# Patient Record
Sex: FEMALE | Race: BLACK OR AFRICAN AMERICAN | Employment: FULL TIME | URBAN - METROPOLITAN AREA
[De-identification: names, ages, dates, MRNs, and addresses within clinical notes are randomized per-mention and may not be internally consistent; named-entity substitution may affect disease eponyms.]

---

## 2017-05-11 ENCOUNTER — OFFICE VISIT (OUTPATIENT)
Dept: FAMILY MEDICINE CLINIC | Age: 62
End: 2017-05-11

## 2017-05-11 VITALS
HEART RATE: 74 BPM | WEIGHT: 133 LBS | TEMPERATURE: 98.2 F | HEIGHT: 64 IN | BODY MASS INDEX: 22.71 KG/M2 | OXYGEN SATURATION: 99 % | RESPIRATION RATE: 16 BRPM | DIASTOLIC BLOOD PRESSURE: 67 MMHG | SYSTOLIC BLOOD PRESSURE: 130 MMHG

## 2017-05-11 DIAGNOSIS — E53.8 B12 DEFICIENCY: ICD-10-CM

## 2017-05-11 DIAGNOSIS — E78.00 HYPERCHOLESTEROLEMIA: Primary | ICD-10-CM

## 2017-05-11 NOTE — PROGRESS NOTES
Pt here to establish care. Requesting to have routine fasting lab work. Denies having any concerns at this time. Pt reports a history of heart murmur when sick or under strain, pt had a heart cath in 2001. Pt has a history of B12 deficiency. Pt has been maintained on oral supplementation. Subjective: (As above and below)     Chief Complaint   Patient presents with   [address]     she is a 63y.o. year old female who presents for evaluation. Reviewed PmHx, RxHx, FmHx, SocHx, AllgHx and updated in chart. Review of Systems - negative except as listed above    Objective:     Vitals:    05/11/17 0940   BP: 130/67   Pulse: 74   Resp: 16   Temp: 98.2 °F (36.8 °C)   TempSrc: Oral   SpO2: 99%   Weight: 133 lb (60.3 kg)   Height: 5' 4\" (1.626 m)     Physical Examination: General appearance - alert, well appearing, and in no distress  Mental status - normal mood, behavior, speech, dress, motor activity, and thought processes  Eyes - pupils equal and reactive, extraocular eye movements intact  Mouth - mucous membranes moist, pharynx normal without lesions  Chest - clear to auscultation, no wheezes, rales or rhonchi, symmetric air entry  Heart - normal rate, regular rhythm, normal S1, S2, no murmurs, rubs, clicks or gallops  Musculoskeletal - no joint tenderness, deformity or swelling  Extremities - peripheral pulses normal, no pedal edema, no clubbing or cyanosis    Assessment/ Plan:   1. Hypercholesterolemia  -check fasting labs  - CBC WITH AUTOMATED DIFF  - LIPID PANEL  - METABOLIC PANEL, COMPREHENSIVE  - TSH 3RD GENERATION  - VITAMIN D, 25 HYDROXY  - HEMOGLOBIN A1C WITH EAG  - HEPATITIS C AB    2. B12 deficiency  - VITAMIN B12     Follow-up Disposition: As needed  I have discussed the diagnosis with the patient and the intended plan as seen in the above orders. The patient has received an after-visit summary and questions were answered concerning future plans.      Medication Side Effects and Warnings were discussed with patient: yes  Patient Labs were reviewed: yes  Patient Past Records were reviewed:  yes    Adriana Chapman M.D.

## 2017-05-11 NOTE — PROGRESS NOTES
Pt here to establish care. Requesting to have routine fasting lab work. Denies having any concerns at this time.

## 2017-05-11 NOTE — MR AVS SNAPSHOT
Visit Information Date & Time Provider Department Dept. Phone Encounter #  
 5/11/2017  9:15 AM Angelo Marrufo MD [address] [facility] [phone] [id number] Upcoming Health Maintenance Date Due Hepatitis C Screening 1955 DTaP/Tdap/Td series (1 - Tdap) 3/26/1978 ZOSTER VACCINE AGE 60> 3/26/2017 INFLUENZA AGE 9 TO ADULT 8/1/2017 PAP AKA CERVICAL CYTOLOGY 4/21/2018 BREAST CANCER SCRN MAMMOGRAM 4/29/2018 COLONOSCOPY 3/19/2019 Allergies as of 5/11/2017  Review Complete On: 5/11/2017 By: Angelo Marrufo MD  
  
 Severity Noted Reaction Type Reactions Aspirin  03/08/2010   Side Effect Other (comments) Stomach pain and bleeding Codeine  04/21/2015    Other (comments) Pt states causes severe abd pain  
 Pcn [Penicillins]  03/08/2010    Hives Also causes nosebleeds Current Immunizations  Reviewed on 4/21/2015 No immunizations on file. Not reviewed this visit You Were Diagnosed With   
  
 Codes Comments Hypercholesterolemia    -  Primary ICD-10-CM: E78.00 ICD-9-CM: 272.0 B12 deficiency     ICD-10-CM: E53.8 ICD-9-CM: 266.2 Vitals BP Pulse Temp Resp Height(growth percentile) Weight(growth percentile) 130/67 (BP 1 Location: Left arm, BP Patient Position: Sitting) 74 98.2 °F (36.8 °C) (Oral) 16 5' 4\" (1.626 m) 133 lb (60.3 kg) LMP SpO2 BMI OB Status Smoking Status 04/04/2012 99% 22.83 kg/m2 Postmenopausal Former Smoker Vitals History BMI and BSA Data Body Mass Index Body Surface Area  
 22.83 kg/m 2 1.65 m 2 Preferred Pharmacy Pharmacy Name Phone [id number] [address] [place] [address] [phone] Your Updated Medication List  
  
   
This list is accurate as of: 5/11/17 10:27 AM.  Always use your most recent med list.  
  
  
  
  
 CALCIUM WITH VITAMIN D tablet Generic drug:  calcium-cholecalciferol (D3)  
 Take 1 Tab by mouth daily. Indications: HYPOCALCEMIA PREVENTION  
  
 FISH OIL 1,000 mg Cap Generic drug:  omega-3 fatty acids-vitamin e Take 1 Cap by mouth daily. ibuprofen 200 mg tablet Commonly known as:  MOTRIN Take  by mouth every six (6) hours as needed. MULTI-KATHERINE 50 AND OVER Tab tablet Generic drug:  multivitamins-minerals-lutein Take 1 Tab by mouth daily. Indications: VITAMIN DEFICIENCY PREVENTION  
  
 VITAMIN B-12 1,000 mcg tablet Generic drug:  cyanocobalamin Take 1,000 mcg by mouth every morning. We Performed the Following CBC WITH AUTOMATED DIFF [22179 CPT(R)] HEMOGLOBIN A1C WITH EAG [37273 CPT(R)] HEPATITIS C AB [00139 CPT(R)] LIPID PANEL [59478 CPT(R)] METABOLIC PANEL, COMPREHENSIVE [16514 CPT(R)] TSH 3RD GENERATION [39173 CPT(R)] VITAMIN B12 [id number] CPT(R)] VITAMIN D, 25 HYDROXY [id number] CPT(R)] To-Do List   
 06/02/2017 3:20 PM  
  Appointment with [facility] SEVERO 1 at [facility] ([phone]) Shower or bathe using soap and water. Do not use deodorant, powder, perfumes, or lotion the day of your exam.  If your prior mammograms were not performed at [place] [address] please bring films with you or forward prior images 2 days before your procedure. Check in at registration 15min before your appointment time unless you were instructed to do otherwise. A script is not necessary, but if you have one, please bring it on the day of the mammogram or have it faxed to the department. [facility] [phone] [facility] [place]  [phone] [facility] [address] [place]  [phone] [facility] [phone] [address] [phone] [facility] & HEALTH SERVICES! Dear Judit Prather: Thank you for requesting a Krikle account. Our records indicate that you already have an active Krikle account. You can access your account anytime at [email or web address] Did you know that you can access your hospital and ER discharge instructions at any time in [employer]? You can also review all of your test results from your hospital stay or ER visit. Additional Information If you have questions, please visit the Frequently Asked Questions section of the [employer] website at [email or web address] Remember, [employer] is NOT to be used for urgent needs. For medical emergencies, dial 911. Now available from your iPhone and Android! Please provide this summary of care documentation to your next provider. Your primary care clinician is listed as Samantha Lantigua. If you have any questions after today's visit, please call [phone].

## 2017-05-12 LAB
25(OH)D3+25(OH)D2 SERPL-MCNC: 50.1 NG/ML (ref 30–100)
ALBUMIN SERPL-MCNC: 5 G/DL (ref 3.6–4.8)
ALBUMIN/GLOB SERPL: 2.2 {RATIO} (ref 1.2–2.2)
ALP SERPL-CCNC: 75 IU/L (ref 39–117)
ALT SERPL-CCNC: 15 IU/L (ref 0–32)
AST SERPL-CCNC: 19 IU/L (ref 0–40)
BASOPHILS # BLD AUTO: 0 X10E3/UL (ref 0–0.2)
BASOPHILS NFR BLD AUTO: 0 %
BILIRUB SERPL-MCNC: 0.6 MG/DL (ref 0–1.2)
BUN SERPL-MCNC: 11 MG/DL (ref 8–27)
BUN/CREAT SERPL: 12 (ref 12–28)
CALCIUM SERPL-MCNC: 9.7 MG/DL (ref 8.7–10.3)
CHLORIDE SERPL-SCNC: 101 MMOL/L (ref 96–106)
CHOLEST SERPL-MCNC: 227 MG/DL (ref 100–199)
CO2 SERPL-SCNC: 24 MMOL/L (ref 18–29)
CREAT SERPL-MCNC: 0.91 MG/DL (ref 0.57–1)
EOSINOPHIL # BLD AUTO: 0.2 X10E3/UL (ref 0–0.4)
EOSINOPHIL NFR BLD AUTO: 2 %
ERYTHROCYTE [DISTWIDTH] IN BLOOD BY AUTOMATED COUNT: 14.1 % (ref 12.3–15.4)
EST. AVERAGE GLUCOSE BLD GHB EST-MCNC: 126 MG/DL
GLOBULIN SER CALC-MCNC: 2.3 G/DL (ref 1.5–4.5)
GLUCOSE SERPL-MCNC: 95 MG/DL (ref 65–99)
HBA1C MFR BLD: 6 % (ref 4.8–5.6)
HCT VFR BLD AUTO: 40.3 % (ref 34–46.6)
HCV AB S/CO SERPL IA: <0.1 S/CO RATIO (ref 0–0.9)
HDLC SERPL-MCNC: 94 MG/DL
HGB BLD-MCNC: 13 G/DL (ref 11.1–15.9)
IMM GRANULOCYTES # BLD: 0 X10E3/UL (ref 0–0.1)
IMM GRANULOCYTES NFR BLD: 0 %
INTERPRETATION, 910389: NORMAL
LDLC SERPL CALC-MCNC: 122 MG/DL (ref 0–99)
LYMPHOCYTES # BLD AUTO: 2.6 X10E3/UL (ref 0.7–3.1)
LYMPHOCYTES NFR BLD AUTO: 37 %
MCH RBC QN AUTO: 29 PG (ref 26.6–33)
MCHC RBC AUTO-ENTMCNC: 32.3 G/DL (ref 31.5–35.7)
MCV RBC AUTO: 90 FL (ref 79–97)
MONOCYTES # BLD AUTO: 0.6 X10E3/UL (ref 0.1–0.9)
MONOCYTES NFR BLD AUTO: 8 %
NEUTROPHILS # BLD AUTO: 3.6 X10E3/UL (ref 1.4–7)
NEUTROPHILS NFR BLD AUTO: 53 %
PLATELET # BLD AUTO: 355 X10E3/UL (ref 150–379)
POTASSIUM SERPL-SCNC: 4.3 MMOL/L (ref 3.5–5.2)
PROT SERPL-MCNC: 7.3 G/DL (ref 6–8.5)
RBC # BLD AUTO: 4.48 X10E6/UL (ref 3.77–5.28)
SODIUM SERPL-SCNC: 140 MMOL/L (ref 134–144)
TRIGL SERPL-MCNC: 56 MG/DL (ref 0–149)
TSH SERPL DL<=0.005 MIU/L-ACNC: 0.77 UIU/ML (ref 0.45–4.5)
VIT B12 SERPL-MCNC: 360 PG/ML (ref 211–946)
VLDLC SERPL CALC-MCNC: 11 MG/DL (ref 5–40)
WBC # BLD AUTO: 6.9 X10E3/UL (ref 3.4–10.8)

## 2017-05-12 NOTE — PROGRESS NOTES
Cholesterol is elevated, please closely monitor diet and increase exercise, recheck in 6 months. Increase in risk for diabetes, please closely monitor diet and increase exercise   All other labs are within normal limits. A message has been sent in [employer] and the lab work released to the patient.

## 2017-06-02 ENCOUNTER — HOSPITAL ENCOUNTER (OUTPATIENT)
Dept: MAMMOGRAPHY | Age: 62
Discharge: HOME | End: 2017-06-02
Attending: INTERNAL MEDICINE
Payer: COMMERCIAL

## 2017-06-02 DIAGNOSIS — Z12.31 VISIT FOR SCREENING MAMMOGRAM: ICD-10-CM

## 2017-06-02 PROCEDURE — 77067 SCR MAMMO BI INCL CAD: CPT

## 2017-06-13 ENCOUNTER — OFFICE VISIT (OUTPATIENT)
Dept: FAMILY MEDICINE CLINIC | Age: 62
End: 2017-06-13

## 2017-06-13 VITALS
SYSTOLIC BLOOD PRESSURE: 128 MMHG | HEIGHT: 64 IN | RESPIRATION RATE: 18 BRPM | TEMPERATURE: 98.3 F | BODY MASS INDEX: 22.71 KG/M2 | DIASTOLIC BLOOD PRESSURE: 64 MMHG | HEART RATE: 88 BPM | WEIGHT: 133 LBS | OXYGEN SATURATION: 99 %

## 2017-06-13 DIAGNOSIS — Z01.419 WELL WOMAN EXAM WITH ROUTINE GYNECOLOGICAL EXAM: Primary | ICD-10-CM

## 2017-06-13 NOTE — PROGRESS NOTES
Pt here for PAP only. States that she had fasting lab work done in April. Subjective:   63 y.o. female for Well Woman Check. Patient's last menstrual period was 2012. Social History: has sex with males. Pertinent past medical hstory:   Past Medical History:   Diagnosis Date    Menorrhagia     Vitamin B12 deficiency      Patient Active Problem List   Diagnosis Code    Fibroid uterus D25.9    Menorrhagia N92.0    History of   Z98.891    History of Tonsillectomy age 8 Z80.80    Hypercholesterolemia E78.00     Current Outpatient Prescriptions   Medication Sig Dispense Refill    omega-3 fatty acids-vitamin e (FISH OIL) 1,000 mg cap Take 1 Cap by mouth daily.  ibuprofen (MOTRIN) 200 mg tablet Take  by mouth every six (6) hours as needed.  cyanocobalamin (VITAMIN B-12) 1,000 mcg tablet Take 1,000 mcg by mouth every morning.  Calcium-Cholecalciferol, D3, (CALCIUM WITH VITAMIN D) 600-400 mg-unit Tab Take 1 Tab by mouth daily. Indications: HYPOCALCEMIA PREVENTION      multivitamins-minerals-lutein (MULTI-KATHERINE 50 & OVER) Tab Take 1 Tab by mouth daily. Indications: VITAMIN DEFICIENCY PREVENTION       Allergies   Allergen Reactions    Aspirin Other (comments)     Stomach pain and bleeding    Codeine Other (comments)     Pt states causes severe abd pain    Pcn [Penicillins] Hives     Also causes nosebleeds        ROS:  Feeling well. No dyspnea or chest pain on exertion. No abdominal pain, change in bowel habits, black or bloody stools. No urinary tract symptoms. GYN ROS: normal menses, no abnormal bleeding, pelvic pain or discharge, no breast pain or new or enlarging lumps on self exam. No neurological complaints.     Objective:     Visit Vitals    /64 (BP 1 Location: Left arm, BP Patient Position: Sitting)    Pulse 88    Temp 98.3 °F (36.8 °C) (Oral)    Resp 18    Ht 5' 4\" (1.626 m)    Wt 133 lb (60.3 kg)    LMP 2012    SpO2 99%    BMI 22.83 kg/m2 The patient appears well, alert, oriented x 3, in no distress. ENT normal.  Neck supple. No adenopathy or thyromegaly. TERESSA. Lungs are clear, good air entry, no wheezes, rhonchi or rales. S1 and S2 normal, no murmurs, regular rate and rhythm. Abdomen soft without tenderness, guarding, mass or organomegaly. Extremities show no edema, normal peripheral pulses. Neurological is normal, no focal findings. BREAST EXAM: breasts appear normal, no suspicious masses, no skin or nipple changes or axillary nodes    PELVIC EXAM: VULVA: normal appearing vulva with no masses, tenderness or lesions, VAGINA: normal appearing vagina with normal color and discharge, no lesions, CERVIX: normal appearing cervix without discharge or lesions, UTERUS: uterus is normal size, shape, consistency and nontender, ADNEXA: normal adnexa in size, nontender and no masses, PAP: Pap smear done today    Assessment/Plan:   well woman  Mammogram-done  pap smear  additional lab tests per orders  return annually or prn    ICD-10-CM ICD-9-CM    1. Well woman exam with routine gynecological exam Z01.419 V72.31 PAP + HPV DNA (HIGH RISK)    [V72.31]     Encounter Diagnoses   Name Primary?  Well woman exam with routine gynecological exam Yes    Comment: [V72.31]     Orders Placed This Encounter    PAP, HPV HIGH RISK   .

## 2017-06-13 NOTE — MR AVS SNAPSHOT
Visit Information Date & Time Provider Department Dept. Phone Encounter #  
 6/13/2017  8:30 AM Ashu Fernandez MD [address] [facility] [phone] [id number] Upcoming Health Maintenance Date Due DTaP/Tdap/Td series (1 - Tdap) 3/26/1978 ZOSTER VACCINE AGE 60> 3/26/2017 INFLUENZA AGE 9 TO ADULT 8/1/2017 PAP AKA CERVICAL CYTOLOGY 4/21/2018 COLONOSCOPY 3/19/2019 BREAST CANCER SCRN MAMMOGRAM 6/2/2019 Allergies as of 6/13/2017  Review Complete On: 6/13/2017 By: Ashu Fernandez MD  
  
 Severity Noted Reaction Type Reactions Aspirin  03/08/2010   Side Effect Other (comments) Stomach pain and bleeding Codeine  04/21/2015    Other (comments) Pt states causes severe abd pain  
 Pcn [Penicillins]  03/08/2010    Hives Also causes nosebleeds Current Immunizations  Reviewed on 4/21/2015 No immunizations on file. Not reviewed this visit You Were Diagnosed With   
  
 Codes Comments Well woman exam with routine gynecological exam    -  Primary ICD-10-CM: F63.425 ICD-9-CM: V72.31 [V72.31] Vitals BP Pulse Temp Resp Height(growth percentile) Weight(growth percentile) 128/64 (BP 1 Location: Left arm, BP Patient Position: Sitting) 88 98.3 °F (36.8 °C) (Oral) 18 5' 4\" (1.626 m) 133 lb (60.3 kg) LMP SpO2 BMI OB Status Smoking Status 04/04/2012 99% 22.83 kg/m2 Postmenopausal Former Smoker Vitals History BMI and BSA Data Body Mass Index Body Surface Area  
 22.83 kg/m 2 1.65 m 2 Preferred Pharmacy Pharmacy Name Phone [id number] [address] [place] [address] [phone] Your Updated Medication List  
  
   
This list is accurate as of: 6/13/17  9:13 AM.  Always use your most recent med list.  
  
  
  
  
 CALCIUM WITH VITAMIN D tablet Generic drug:  calcium-cholecalciferol (D3) Take 1 Tab by mouth daily.  Indications: HYPOCALCEMIA PREVENTION  
  
 FISH OIL 1,000 mg Cap Generic drug:  omega-3 fatty acids-vitamin e Take 1 Cap by mouth daily. ibuprofen 200 mg tablet Commonly known as:  MOTRIN Take  by mouth every six (6) hours as needed. MULTI-KATHERINE 50 AND OVER Tab tablet Generic drug:  multivitamins-minerals-lutein Take 1 Tab by mouth daily. Indications: VITAMIN DEFICIENCY PREVENTION  
  
 VITAMIN B-12 1,000 mcg tablet Generic drug:  cyanocobalamin Take 1,000 mcg by mouth every morning. We Performed the Following PAP + HPV DNA (HIGH RISK) [06413 CPT(R)] Introducing [facility] & [facility] SERVICES! Dear VANDANA KEARNS [facility]: Thank you for requesting a Rebit account. Our records indicate that you already have an active Rebit account. You can access your account anytime at [email or web address]. [email or web address] Medical Systems/Ladies Who Launch Did you know that you can access your hospital and ER discharge instructions at any time in Rebit? You can also review all of your test results from your hospital stay or ER visit. Additional Information If you have questions, please visit the Frequently Asked Questions section of the Rebit website at [email or web address] Systems/[email or web address] Who Launch/. Remember, Re[employer] is NOT to be used for urgent needs. For medical emergencies, dial 911. Now available from your iPhone and Android! Please provide this summary of care documentation to your next provider. Your primary care clinician is listed as Samantha Lantigua. If you have any questions after today's visit, please call [phone].

## 2017-06-15 LAB
CYTOLOGIST CVX/VAG CYTO: NORMAL
DX ICD CODE: NORMAL
HPV I/H RISK 1 DNA CVX QL PROBE+SIG AMP: NEGATIVE
Lab: NORMAL
OTHER STN SPEC: NORMAL
PATH REPORT.FINAL DX SPEC: NORMAL
STAT OF ADQ CVX/VAG CYTO-IMP: NORMAL

## 2017-06-15 NOTE — PROGRESS NOTES
PAP WNL  HPV neg  A message has been sent in [employer] and the lab work released to the patient.

## 2018-06-26 ENCOUNTER — OFFICE VISIT (OUTPATIENT)
Dept: FAMILY MEDICINE CLINIC | Age: 63
End: 2018-06-26

## 2018-06-26 VITALS
HEART RATE: 81 BPM | OXYGEN SATURATION: 98 % | HEIGHT: 64 IN | WEIGHT: 133 LBS | SYSTOLIC BLOOD PRESSURE: 138 MMHG | DIASTOLIC BLOOD PRESSURE: 81 MMHG | TEMPERATURE: 81 F | BODY MASS INDEX: 22.71 KG/M2 | RESPIRATION RATE: 18 BRPM

## 2018-06-26 DIAGNOSIS — E78.00 HYPERCHOLESTEROLEMIA: Primary | ICD-10-CM

## 2018-06-26 DIAGNOSIS — E53.8 B12 DEFICIENCY: ICD-10-CM

## 2018-06-26 DIAGNOSIS — Z00.00 ROUTINE GENERAL MEDICAL EXAMINATION AT A HEALTH CARE FACILITY: ICD-10-CM

## 2018-06-26 NOTE — MR AVS SNAPSHOT
[address] [place] [address] 
[phone] Patient: Humberto Travis MRN: [id number] YTC:[id number] Visit Information Date & Time Provider Department Dept. Phone Encounter #  
 6/26/2018  8:30 AM Ariana Roberts MD [address] [facility] [phone] [id number] Upcoming Health Maintenance Date Due DTaP/Tdap/Td series (1 - Tdap) 3/26/1978 ZOSTER VACCINE AGE 60> 1/26/2017 Influenza Age 5 to Adult 8/1/2018 COLONOSCOPY 3/19/2019 BREAST CANCER SCRN MAMMOGRAM 6/2/2019 PAP AKA CERVICAL CYTOLOGY 6/13/2020 Allergies as of 6/26/2018  Review Complete On: 6/26/2018 By: Ariana Roberts MD  
  
 Severity Noted Reaction Type Reactions Aspirin  03/08/2010   Side Effect Other (comments) Stomach pain and bleeding Codeine  04/21/2015    Other (comments) Pt states causes severe abd pain  
 Pcn [Penicillins]  03/08/2010    Hives Also causes nosebleeds Current Immunizations  Reviewed on 4/21/2015 No immunizations on file. Not reviewed this visit You Were Diagnosed With   
  
 Codes Comments Hypercholesterolemia    -  Primary ICD-10-CM: E78.00 ICD-9-CM: 272.0 B12 deficiency     ICD-10-CM: E53.8 ICD-9-CM: 266.2 Routine general medical examination at a health care facility     ICD-10-CM: Z00.00 ICD-9-CM: V70.0 Vitals BP Pulse Temp Resp Height(growth percentile) Weight(growth percentile) 144/72 (BP 1 Location: Left arm, BP Patient Position: Sitting) 81 (!) 81 °F (27.2 °C) (Oral) 18 5' 4\" (1.626 m) 133 lb (60.3 kg) LMP SpO2 BMI OB Status Smoking Status 04/04/2012 98% 22.83 kg/m2 Postmenopausal Former Smoker Vitals History BMI and BSA Data Body Mass Index Body Surface Area  
 22.83 kg/m 2 1.65 m 2 Preferred Pharmacy Pharmacy Name Phone [id number], [address] [place] [address] [phone] Your Updated Medication List  
  
   
This list is accurate as of 6/26/18  9:38 AM.  Always use your most recent med list.  
  
  
  
  
 CALCIUM WITH VITAMIN D tablet Generic drug:  calcium-cholecalciferol (D3) Take 1 Tab by mouth daily. Indications: HYPOCALCEMIA PREVENTION  
  
 FISH OIL 1,000 mg Cap Generic drug:  omega-3 fatty acids-vitamin e Take 1 Cap by mouth daily. ibuprofen 200 mg tablet Commonly known as:  MOTRIN Take  by mouth every six (6) hours as needed. MULTI-KATHERINE 50 AND OVER Tab tablet Generic drug:  multivitamins-minerals-lutein Take 1 Tab by mouth daily. Indications: VITAMIN DEFICIENCY PREVENTION  
  
 VITAMIN B-12 1,000 mcg tablet Generic drug:  cyanocobalamin Take 1,000 mcg by mouth every morning. We Performed the Following CBC WITH AUTOMATED DIFF [05184 CPT(R)] HEMOGLOBIN A1C WITH EAG [21542 CPT(R)] LIPID PANEL [50286 CPT(R)] METABOLIC PANEL, COMPREHENSIVE [46835 CPT(R)] TSH 3RD GENERATION [45453 CPT(R)] VITAMIN B12 [id number] CPT(R)] VITAMIN D, 25 HYDROXY [id number] CPT(R)] To-Do List   
 07/10/2018 8:30 AM  
  Appointment with [facility] SEVERO 1 at [facility] ([phone]) Shower or bathe using soap and water. Do not use deodorant, powder, perfumes, or lotion the day of your exam.  If your prior mammograms were not performed at [facility] 6 please bring films with you or forward prior images 2 days before your procedure. Check in at registration 15min before your appointment time unless you were instructed to do otherwise. A script is not necessary, but if you have one, please bring it on the day of the mammogram or have it faxed to the department. You are responsible for finding a method of transportation to your appointment. If you don't have transportation, please reschedule your appointment at least 24 hours in advance.   [facility] [phone] [facility]  [phone] [facility] [address] [facility] [phone] [address] [place] [address] [id number] [phone] Introducing [facility] & [facility] SERVICES! Dear Jose Uribe: Thank you for requesting a [employer] account. Our records indicate that you already have an active [employer] account. You can access your account anytime at [email or web address] Did you know that you can access your hospital and ER discharge instructions at any time in [employer]? You can also review all of your test results from your hospital stay or ER visit. Additional Information If you have questions, please visit the Frequently Asked Questions section of the [employer] website at [email or web address] Remember, [employer] is NOT to be used for urgent needs. For medical emergencies, dial 911. Now available from your iPhone and Android! Please provide this summary of care documentation to your next provider. Your primary care clinician is listed as Samantha Lantigua. If you have any questions after today's visit, please call [phone].

## 2018-06-26 NOTE — PROGRESS NOTES
Chief Complaint   Patient presents with    Complete Physical     Requesting a pap     Pt seen in the office today for a physical   Pt is also requesting a pap due to a change in partners as of October 2017    Last PAP was one year ago, normal and HPV testing. Subjective: (As above and below)     Chief Complaint   Patient presents with    Complete Physical     Requesting a pap     she is a 66y.o. year old female who presents for evaluation. Reviewed PmHx, RxHx, FmHx, SocHx, AllgHx and updated in chart. Review of Systems - negative except as listed above    Objective:     Vitals:    06/26/18 0842   BP: 144/72   Pulse: 81   Resp: 18   Temp: (!) 81 °F (27.2 °C)   TempSrc: Oral   SpO2: 98%   Weight: 133 lb (60.3 kg)   Height: 5' 4\" (1.626 m)     Physical Examination: General appearance - alert, well appearing, and in no distress  Mental status - normal mood, behavior, speech, dress, motor activity, and thought processes  Mouth - mucous membranes moist, pharynx normal without lesions  Chest - clear to auscultation, no wheezes, rales or rhonchi, symmetric air entry  Heart - normal rate, regular rhythm, normal S1, S2, no murmurs, rubs, clicks or gallops  Musculoskeletal - no joint tenderness, deformity or swelling  Extremities - peripheral pulses normal, no pedal edema, no clubbing or cyanosis    Assessment/ Plan:   1. Hypercholesterolemia  -check fasting labs  - METABOLIC PANEL, COMPREHENSIVE  - CBC WITH AUTOMATED DIFF  - LIPID PANEL  - HEMOGLOBIN A1C WITH EAG  - TSH 3RD GENERATION  - VITAMIN D, 25 HYDROXY    2. B12 deficiency  - VITAMIN B12    3. Routine general medical examination at a health care facility  -reviewed preventative recomendations     Follow-up Disposition: As needed  I have discussed the diagnosis with the patient and the intended plan as seen in the above orders. The patient has received an after-visit summary and questions were answered concerning future plans.      Medication Side Effects and Warnings were discussed with patient: yes  Patient Labs were reviewed: yes  Patient Past Records were reviewed:  yes    Bob Herrera M.D.

## 2018-06-27 LAB
25(OH)D3+25(OH)D2 SERPL-MCNC: 45.4 NG/ML (ref 30–100)
ALBUMIN SERPL-MCNC: 4.9 G/DL (ref 3.6–4.8)
ALBUMIN/GLOB SERPL: 1.8 {RATIO} (ref 1.2–2.2)
ALP SERPL-CCNC: 89 IU/L (ref 39–117)
ALT SERPL-CCNC: 13 IU/L (ref 0–32)
AST SERPL-CCNC: 20 IU/L (ref 0–40)
BASOPHILS # BLD AUTO: 0 X10E3/UL (ref 0–0.2)
BASOPHILS NFR BLD AUTO: 0 %
BILIRUB SERPL-MCNC: 0.8 MG/DL (ref 0–1.2)
BUN SERPL-MCNC: 13 MG/DL (ref 8–27)
BUN/CREAT SERPL: 15 (ref 12–28)
CALCIUM SERPL-MCNC: 9.5 MG/DL (ref 8.7–10.3)
CHLORIDE SERPL-SCNC: 102 MMOL/L (ref 96–106)
CHOLEST SERPL-MCNC: 224 MG/DL (ref 100–199)
CO2 SERPL-SCNC: 23 MMOL/L (ref 20–29)
CREAT SERPL-MCNC: 0.87 MG/DL (ref 0.57–1)
EOSINOPHIL # BLD AUTO: 0.1 X10E3/UL (ref 0–0.4)
EOSINOPHIL NFR BLD AUTO: 2 %
ERYTHROCYTE [DISTWIDTH] IN BLOOD BY AUTOMATED COUNT: 13.7 % (ref 12.3–15.4)
EST. AVERAGE GLUCOSE BLD GHB EST-MCNC: 117 MG/DL
GLOBULIN SER CALC-MCNC: 2.7 G/DL (ref 1.5–4.5)
GLUCOSE SERPL-MCNC: 109 MG/DL (ref 65–99)
HBA1C MFR BLD: 5.7 % (ref 4.8–5.6)
HCT VFR BLD AUTO: 41 % (ref 34–46.6)
HDLC SERPL-MCNC: 92 MG/DL
HGB BLD-MCNC: 13.3 G/DL (ref 11.1–15.9)
IMM GRANULOCYTES # BLD: 0 X10E3/UL (ref 0–0.1)
IMM GRANULOCYTES NFR BLD: 0 %
INTERPRETATION, 910389: NORMAL
LDLC SERPL CALC-MCNC: 117 MG/DL (ref 0–99)
LYMPHOCYTES # BLD AUTO: 2 X10E3/UL (ref 0.7–3.1)
LYMPHOCYTES NFR BLD AUTO: 31 %
MCH RBC QN AUTO: 28.9 PG (ref 26.6–33)
MCHC RBC AUTO-ENTMCNC: 32.4 G/DL (ref 31.5–35.7)
MCV RBC AUTO: 89 FL (ref 79–97)
MONOCYTES # BLD AUTO: 0.6 X10E3/UL (ref 0.1–0.9)
MONOCYTES NFR BLD AUTO: 9 %
NEUTROPHILS # BLD AUTO: 3.7 X10E3/UL (ref 1.4–7)
NEUTROPHILS NFR BLD AUTO: 58 %
PLATELET # BLD AUTO: 345 X10E3/UL (ref 150–379)
POTASSIUM SERPL-SCNC: 4.3 MMOL/L (ref 3.5–5.2)
PROT SERPL-MCNC: 7.6 G/DL (ref 6–8.5)
RBC # BLD AUTO: 4.6 X10E6/UL (ref 3.77–5.28)
SODIUM SERPL-SCNC: 140 MMOL/L (ref 134–144)
TRIGL SERPL-MCNC: 74 MG/DL (ref 0–149)
TSH SERPL DL<=0.005 MIU/L-ACNC: 1.04 UIU/ML (ref 0.45–4.5)
VIT B12 SERPL-MCNC: 481 PG/ML (ref 232–1245)
VLDLC SERPL CALC-MCNC: 15 MG/DL (ref 5–40)
WBC # BLD AUTO: 6.5 X10E3/UL (ref 3.4–10.8)

## 2018-06-27 NOTE — PROGRESS NOTES
Cholesterol is elevated, please closely monitor diet and increase exercise, recheck in 6 months. Improved diabetic risk- keep up the great work! All other labs are within normal limits. A message has been sent in [employer] and the lab work released to the patient.

## 2018-07-10 ENCOUNTER — HOSPITAL ENCOUNTER (OUTPATIENT)
Dept: MAMMOGRAPHY | Age: 63
Discharge: HOME | End: 2018-07-10
Attending: FAMILY MEDICINE
Payer: COMMERCIAL

## 2018-07-10 DIAGNOSIS — Z12.39 SCREENING BREAST EXAMINATION: ICD-10-CM

## 2018-07-10 PROCEDURE — 77067 SCR MAMMO BI INCL CAD: CPT

## 2019-08-06 ENCOUNTER — HOSPITAL ENCOUNTER (OUTPATIENT)
Dept: MAMMOGRAPHY | Age: 64
Discharge: HOME | End: 2019-08-06
Attending: INTERNAL MEDICINE
Payer: COMMERCIAL

## 2019-08-06 DIAGNOSIS — Z12.39 SCREENING BREAST EXAMINATION: ICD-10-CM

## 2019-08-06 PROCEDURE — 77067 SCR MAMMO BI INCL CAD: CPT

## 2020-11-09 ENCOUNTER — TRANSCRIBE ORDER (OUTPATIENT)
Dept: SCHEDULING | Age: 65
End: 2020-11-09

## 2020-11-09 DIAGNOSIS — Z12.31 VISIT FOR SCREENING MAMMOGRAM: Primary | ICD-10-CM

## 2021-01-08 ENCOUNTER — TRANSCRIBE ORDER (OUTPATIENT)
Dept: SCHEDULING | Age: 66
End: 2021-01-08

## 2021-01-08 DIAGNOSIS — Z78.0 POSTMENOPAUSAL STATUS: Primary | ICD-10-CM
